# Patient Record
Sex: MALE | Race: WHITE | NOT HISPANIC OR LATINO | Employment: FULL TIME | URBAN - METROPOLITAN AREA
[De-identification: names, ages, dates, MRNs, and addresses within clinical notes are randomized per-mention and may not be internally consistent; named-entity substitution may affect disease eponyms.]

---

## 2024-01-18 ENCOUNTER — HOSPITAL ENCOUNTER (EMERGENCY)
Facility: MEDICAL CENTER | Age: 39
End: 2024-01-19
Attending: EMERGENCY MEDICINE
Payer: COMMERCIAL

## 2024-01-18 ENCOUNTER — APPOINTMENT (OUTPATIENT)
Dept: RADIOLOGY | Facility: MEDICAL CENTER | Age: 39
End: 2024-01-18
Attending: EMERGENCY MEDICINE
Payer: COMMERCIAL

## 2024-01-18 VITALS
SYSTOLIC BLOOD PRESSURE: 130 MMHG | TEMPERATURE: 98.5 F | DIASTOLIC BLOOD PRESSURE: 90 MMHG | HEIGHT: 71 IN | OXYGEN SATURATION: 100 % | RESPIRATION RATE: 16 BRPM | BODY MASS INDEX: 26.67 KG/M2 | HEART RATE: 74 BPM | WEIGHT: 190.48 LBS

## 2024-01-18 DIAGNOSIS — S62.630B OPEN DISPLACED FRACTURE OF DISTAL PHALANX OF RIGHT INDEX FINGER, INITIAL ENCOUNTER: Primary | ICD-10-CM

## 2024-01-18 PROCEDURE — 99283 EMERGENCY DEPT VISIT LOW MDM: CPT

## 2024-01-18 RX ORDER — ACETAMINOPHEN 500 MG
1000 TABLET ORAL ONCE
Status: DISCONTINUED | OUTPATIENT
Start: 2024-01-18 | End: 2024-01-19 | Stop reason: HOSPADM

## 2024-01-18 NOTE — LETTER
[facility], EMERGENCY DEPT   [address], [place] [address]  Phone: Dept: [phone] - Fax:        [facility] Progress Report and Disability Certification  Date of Service: 2024   No Show:  No  Date / Time of Next Visit: 2024   Claim Information   Patient Name: Nicholas Britton  Claim Number:     Employer:   [employer] Date of Injury: 2024     Insurer / TPA:   ID / SSN:    Occupation:  Diagnosis: The encounter diagnosis was Open displaced fracture of distal phalanx of right index finger, initial encounter.    Medical Information   Related to Industrial Injury? Yes ***   Subjective Complaints:  Second evaluation for new amputation of fingertip   Objective Findings: Near potation of fingertip   Pre-Existing Condition(s): None   Assessment:   Condition Same    Status: Additional Care Required  Permanent Disability:No    Plan:      Diagnostics:      Comments:  Patient will need to see orthopedic hand specialist    Disability Information   Status: Released to Restricted Duty    From:  2024  Through: 2024 Restrictions are: Temporary   Physical Restrictions   Sitting:    Standing:    Stooping:    Bending:      Squatting:    Walking:    Climbing:    Pushing:      Pulling:    Other:    Reaching Above Shoulder (L):   Reaching Above Shoulder (R):       Reaching Below Shoulder (L):    Reaching Below Shoulder (R):      Not to exceed Weight Limits   Carrying(hrs):   Weight Limit(lb): < or = to 10 pounds Lifting(hrs):   Weight  Limit(lb): < or = to 10 pounds   Comments: Patient should have restricted light duty, he should not use the injured hand until cleared by orthopedic surgery    Repetitive Actions   Hands: i.e. Fine Manipulations from [place] hrs/day   Feet: i.e. Operating Foot Controls:     Driving / Operate Machinery:     Physician Name: Martínez Saeed Physician Signature: Jay  COLT LEDESMA  e-Signature:  , Medical Director   Clinic Name / Location: [facility], EMERGENCY DEPT  [address]  [place] [address]  [phone]     Clinic Phone Number: Dept: [phone]   Appointment Time:  Visit Start Time:    Check-In Time:  10:11 PM Visit Discharge Time:    Original-Treating Physician or Chiropractor    Page 2-Insurer/TPA    Page 3-Employer    Page 4-Employee

## 2024-01-19 NOTE — ED NOTES
Patient discharged per orders.  Pt verbalized understanding of discharge instructions. Pt leaving in stable condition with all belongings.

## 2024-01-19 NOTE — DISCHARGE INSTRUCTIONS
Continue take the medications prescribed by the prior emergency department.  I have given the name and number and address of [facility], and I want to go there for Long in the morning when they open.  You will need to see a hand surgeon at some point.  Keep an eye out for infection which includes fevers, pus or discharge from the wound, swelling.  If you have any other concerns, please return to the ED for further evaluation and treatment.  Thank you for coming in today.

## 2024-01-19 NOTE — ED TRIAGE NOTES
"Chief Complaint   Patient presents with    Digit Pain     Right index finger, pt was at work when the [employer] mine vehicle was closed and his finger got stuck. No active bleeding. Wound wrapped with gauze on arrival       Pt ambulatory for above complaint. Pt went to [facility] , got stitches and imaging done but was told to come  here for further eval.       BP (!) 133/91   Pulse 79   Temp 36.2 °C (97.1 °F) (Temporal)   Resp 16   Ht 1.803 m (5' 11\")   Wt 86.4 kg (190 lb 7.6 oz)   SpO2 96%   BMI 26.57 kg/m²    "

## 2024-01-19 NOTE — ED PROVIDER NOTES
ED Provider Note    Scribed for Martínez Saeed by Matthew Cross. 1/18/2024  10:40 PM    Primary care provider: No primary care provider on file.  Means of arrival: Walk-in  History obtained from: Patient  History limited by: None    CHIEF COMPLAINT  Chief Complaint   Patient presents with    Digit Pain     Right index finger, pt was at work when the [employer] mine vehicle was closed and his finger got stuck. No active bleeding. Wound wrapped with gauze on arrival     EXTERNAL RECORDS REVIEWED  External ED Note reviewed outside notes and imaging results from patient's visit in [facility] today    HPI/ROS  LIMITATION TO HISTORY   Select: : None    HPI  Nicholas Britton is a 38 y.o. male who presents to the Emergency Department for evaluation of digit pain onset earlier today. 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 The patient states he presented to [facility] initially where he got stitches and imaging done, but was instructed to present to the ED for further evaluation and consultation with surgeon.  The patient states the tip of his finger got assisted amputated when it got trapped in the door. The patient states he was medicated with hydrocodone at the previous facility and denies any pain currently due to the nerve block. The patient reports he also received antibiotics at the previous facility. The patient reports he is an otherwise healthy adult, and doesn't take any daily medications. Patient reports he is UTD on Tdap.       REVIEW OF SYSTEMS  As above, all other systems reviewed and are negative.   See HPI for further details.     PAST MEDICAL HISTORY     SURGICAL HISTORY  patient denies any surgical history  SOCIAL HISTORY      Social History     Substance and Sexual Activity   Drug Use None noted.     FAMILY HISTORY  No family history on file.  CURRENT MEDICATIONS  Home Medications       Reviewed by Luma FERNANDES R.N.  "(Registered Nurse) on 01/18/24 at 2226  Med List Status: Not Addressed     Medication Last Dose Status        Patient Antonio Taking any Medications                         ALLERGIES  No Known Allergies    PHYSICAL EXAM    VITAL SIGNS:   Vitals:    01/18/24 2211 01/18/24 2221 01/18/24 2300   BP: (!) 133/91  (!) 130/90   Pulse: 79  74   Resp: 16  16   Temp: 36.2 °C (97.1 °F)  36.9 °C (98.5 °F)   TempSrc: Temporal  Temporal   SpO2: 96%  100%   Weight:  86.4 kg (190 lb 7.6 oz)    Height:  1.803 m (5' 11\")      Vitals: My interpretation: borderline hypertensive, not tachycardic, afebrile, not hypoxic    Reinterpretation of vitals: Unchanged unremarkable    PE:   Gen: sitting comfortably, speaking clearly, appears in no acute distress   ENT: Mucous membranes moist, posterior pharynx clear, uvula midline, nares patent bilaterally   Neck: Supple, FROM  Pulmonary: Lungs are clear to auscultation bilaterally. No tachypnea  CV:  RRR, no murmur appreciated, pulses 2+ in both upper and lower extremities  Abdomen: soft, NT/ND; no rebound/guarding  : no CVA or suprapubic tenderness   Neuro: A&Ox4 (person, place, time, situation), speech fluent, gait steady, no focal deficits appreciated  Skin: No rash or lesions.  No pallor or jaundice.  No cyanosis.  Warm and dry.   Finger: Right index finger has a large laceration, approximately 50% circumferential to the posterior aspect of the distal tip of the distal phalanx, nailbed involvement is present, stitches intact, clean dry and intact, tissue appears to be warm and well-perfused, not dusky    COURSE & MEDICAL DECISION MAKING  Nursing notes, VS, PMSFHx, imaging  reviewed in chart.    ED Observation Status? No; Patient does not meet criteria for ED Observation.     Ddx: Strain, sprain, fracture, dislocation, laceration    MDM: 10:40 PM Nicholas Britton is a 38 y.o. male who presented with asking for second opinion regarding a near fingertip amputation that happened at work " earlier today.  Patient had a steel door slam on his distal tip of his right index finger.  Approximately 50% circumferential laceration is present with nailbed involvement.  It was repaired at outside facility where x-ray showed open fracture.  It was soaked, thoroughly cleaned, wound was closed, and patient told to follow-up with orthopedic hand surgery.  He drove here tonight to get a second opinion as he is concerned about losing the tip of his finger.  He is healthy, takes no medications, already received tetanus and antibiotics as well as pain medication prescriptions at outside facility.  Upon arrival here is normal vital signs. The wound appears to be clean, dry, intact.  It appears to be a very successful closure.  He has flexion extension at this time but is somewhat limited but this is likely secondary to the distal phalanx fracture.  Cannot rule out tendon injury as the wound is already closed, but this time patient will still be appropriate for orthopedic follow-up.  He feels reassured.  I given the name and number for the [facility] for him to go to the morning to get a third opinion from an actual surgeon.  He verbalized understanding plan and is amenable.  Return precautions were discussed and he verbalized understanding.    ADDITIONAL PROBLEM LIST AND DISPOSITION    I have discussed management of the patient with the following physicians and THIERRY's:  None.    Discussion of management with other QHP or appropriate source(s): None     Escalation of care considered, and ultimately not performed:diagnostic imaging    Barriers to care at this time, including but not limited to: Patient does not have established PCP and Patient does not have insurance.     Decision tools and prescription drugs considered including, but not limited to:  None .    FINAL IMPRESSION  1. Open displaced fracture of distal phalanx of right index finger, initial encounter Acute      I, Matthew LoveScribmaxwell), am  scribing for, and in the presence of, Martínez Saeed.    Electronically signed by: Matthew Cross (Scribe), 1/18/2024    I, Martínez Saeed personally performed the services described in this documentation, as scribed by Matthew Cross in my presence, and it is both accurate and complete.    The note accurately reflects work and decisions made by me.  Martínez Saeed  1/18/2024  11:21 PM

## 2024-02-05 ENCOUNTER — APPOINTMENT (OUTPATIENT)
Dept: ADMISSIONS | Facility: MEDICAL CENTER | Age: 39
End: 2024-02-05
Attending: ORTHOPAEDIC SURGERY
Payer: COMMERCIAL

## 2024-02-06 ENCOUNTER — APPOINTMENT (OUTPATIENT)
Dept: ADMISSIONS | Facility: MEDICAL CENTER | Age: 39
End: 2024-02-06
Attending: ORTHOPAEDIC SURGERY
Payer: COMMERCIAL

## 2024-02-06 RX ORDER — AMOXICILLIN AND CLAVULANATE POTASSIUM 875; 125 MG/1; MG/1
TABLET, FILM COATED ORAL
COMMUNITY
Start: 2024-01-18 | End: 2024-02-06

## 2024-02-06 RX ORDER — IBUPROFEN 200 MG
200 TABLET ORAL EVERY 6 HOURS PRN
COMMUNITY

## 2024-02-06 RX ORDER — OXYCODONE AND ACETAMINOPHEN 7.5; 325 MG/1; MG/1
TABLET ORAL
COMMUNITY
Start: 2024-01-18

## 2024-02-06 RX ORDER — ACETAMINOPHEN 500 MG
500-1000 TABLET ORAL EVERY 6 HOURS PRN
COMMUNITY

## 2024-02-06 RX ORDER — CEPHALEXIN 500 MG/1
CAPSULE ORAL 2 TIMES DAILY
COMMUNITY
Start: 2024-01-19

## 2024-02-06 RX ORDER — HYDROCODONE BITARTRATE AND ACETAMINOPHEN 7.5; 325 MG/1; MG/1
1 TABLET ORAL EVERY 6 HOURS PRN
COMMUNITY
End: 2024-02-06

## 2024-02-06 NOTE — PREPROCEDURE INSTRUCTIONS
PreAdmit Telephone Appointment: Reviewed the Preparing for your procedure handout with patient over the phone. Patient instructed per pharmacy guidelines regarding taking, holding or contacting provider for instructions on regularly prescribed medications before surgery. Instructed to take the following medications the day of surgery with a sip of water per pharmacy medication guidelines: Oxycodone if needed, Tylenol if needed  Pt denies issues with anesthesia      Confirmed with patient where to check in morning of surgery. Handouts/Brochure/Video emailed to patient.

## 2024-02-07 ENCOUNTER — HOSPITAL ENCOUNTER (OUTPATIENT)
Facility: MEDICAL CENTER | Age: 39
End: 2024-02-07
Attending: ORTHOPAEDIC SURGERY | Admitting: ORTHOPAEDIC SURGERY
Payer: COMMERCIAL

## 2024-02-07 ENCOUNTER — ANESTHESIA EVENT (OUTPATIENT)
Dept: SURGERY | Facility: MEDICAL CENTER | Age: 39
End: 2024-02-07
Payer: COMMERCIAL

## 2024-02-07 ENCOUNTER — ANESTHESIA (OUTPATIENT)
Dept: SURGERY | Facility: MEDICAL CENTER | Age: 39
End: 2024-02-07
Payer: COMMERCIAL

## 2024-02-07 ENCOUNTER — APPOINTMENT (OUTPATIENT)
Dept: RADIOLOGY | Facility: MEDICAL CENTER | Age: 39
End: 2024-02-07
Attending: ORTHOPAEDIC SURGERY
Payer: COMMERCIAL

## 2024-02-07 VITALS
RESPIRATION RATE: 16 BRPM | DIASTOLIC BLOOD PRESSURE: 84 MMHG | SYSTOLIC BLOOD PRESSURE: 126 MMHG | HEART RATE: 88 BPM | WEIGHT: 188.49 LBS | HEIGHT: 71 IN | BODY MASS INDEX: 26.39 KG/M2 | OXYGEN SATURATION: 94 % | TEMPERATURE: 96.8 F

## 2024-02-07 PROBLEM — S62.630B DISPLACED FRACTURE OF DISTAL PHALANX OF RIGHT INDEX FINGER, INITIAL ENCOUNTER FOR OPEN FRACTURE: Status: ACTIVE | Noted: 2024-02-07

## 2024-02-07 PROCEDURE — 700101 HCHG RX REV CODE 250: Performed by: ANESTHESIOLOGY

## 2024-02-07 PROCEDURE — 160048 HCHG OR STATISTICAL LEVEL 1-5: Performed by: ORTHOPAEDIC SURGERY

## 2024-02-07 PROCEDURE — 160039 HCHG SURGERY MINUTES - EA ADDL 1 MIN LEVEL 3: Performed by: ORTHOPAEDIC SURGERY

## 2024-02-07 PROCEDURE — C1713 ANCHOR/SCREW BN/BN,TIS/BN: HCPCS | Performed by: ORTHOPAEDIC SURGERY

## 2024-02-07 PROCEDURE — 700111 HCHG RX REV CODE 636 W/ 250 OVERRIDE (IP): Performed by: ANESTHESIOLOGY

## 2024-02-07 PROCEDURE — 160009 HCHG ANES TIME/MIN: Performed by: ORTHOPAEDIC SURGERY

## 2024-02-07 PROCEDURE — 160002 HCHG RECOVERY MINUTES (STAT): Performed by: ORTHOPAEDIC SURGERY

## 2024-02-07 PROCEDURE — 160035 HCHG PACU - 1ST 60 MINS PHASE I: Performed by: ORTHOPAEDIC SURGERY

## 2024-02-07 PROCEDURE — 502240 HCHG MISC OR SUPPLY RC 0272: Performed by: ORTHOPAEDIC SURGERY

## 2024-02-07 PROCEDURE — 700105 HCHG RX REV CODE 258: Performed by: ORTHOPAEDIC SURGERY

## 2024-02-07 PROCEDURE — 502000 HCHG MISC OR IMPLANTS RC 0278: Performed by: ORTHOPAEDIC SURGERY

## 2024-02-07 PROCEDURE — 160025 RECOVERY II MINUTES (STATS): Performed by: ORTHOPAEDIC SURGERY

## 2024-02-07 PROCEDURE — 700101 HCHG RX REV CODE 250: Performed by: ORTHOPAEDIC SURGERY

## 2024-02-07 PROCEDURE — 160046 HCHG PACU - 1ST 60 MINS PHASE II: Performed by: ORTHOPAEDIC SURGERY

## 2024-02-07 PROCEDURE — 160028 HCHG SURGERY MINUTES - 1ST 30 MINS LEVEL 3: Performed by: ORTHOPAEDIC SURGERY

## 2024-02-07 DEVICE — IMPLANTABLE DEVICE: Type: IMPLANTABLE DEVICE | Site: WRIST | Status: FUNCTIONAL

## 2024-02-07 RX ORDER — DEXAMETHASONE SODIUM PHOSPHATE 4 MG/ML
INJECTION, SOLUTION INTRA-ARTICULAR; INTRALESIONAL; INTRAMUSCULAR; INTRAVENOUS; SOFT TISSUE PRN
Status: DISCONTINUED | OUTPATIENT
Start: 2024-02-07 | End: 2024-02-07 | Stop reason: SURG

## 2024-02-07 RX ORDER — LIDOCAINE HYDROCHLORIDE 20 MG/ML
INJECTION, SOLUTION EPIDURAL; INFILTRATION; INTRACAUDAL; PERINEURAL PRN
Status: DISCONTINUED | OUTPATIENT
Start: 2024-02-07 | End: 2024-02-07 | Stop reason: SURG

## 2024-02-07 RX ORDER — SODIUM CHLORIDE, SODIUM LACTATE, POTASSIUM CHLORIDE, CALCIUM CHLORIDE 600; 310; 30; 20 MG/100ML; MG/100ML; MG/100ML; MG/100ML
INJECTION, SOLUTION INTRAVENOUS CONTINUOUS
Status: DISCONTINUED | OUTPATIENT
Start: 2024-02-07 | End: 2024-02-07 | Stop reason: HOSPADM

## 2024-02-07 RX ORDER — ONDANSETRON 2 MG/ML
4 INJECTION INTRAMUSCULAR; INTRAVENOUS
Status: DISCONTINUED | OUTPATIENT
Start: 2024-02-07 | End: 2024-02-07 | Stop reason: HOSPADM

## 2024-02-07 RX ORDER — MIDAZOLAM HYDROCHLORIDE 1 MG/ML
INJECTION INTRAMUSCULAR; INTRAVENOUS PRN
Status: DISCONTINUED | OUTPATIENT
Start: 2024-02-07 | End: 2024-02-07 | Stop reason: SURG

## 2024-02-07 RX ORDER — OXYCODONE HCL 5 MG/5 ML
5 SOLUTION, ORAL ORAL
Status: DISCONTINUED | OUTPATIENT
Start: 2024-02-07 | End: 2024-02-07 | Stop reason: HOSPADM

## 2024-02-07 RX ORDER — MEPERIDINE HYDROCHLORIDE 25 MG/ML
12.5 INJECTION INTRAMUSCULAR; INTRAVENOUS; SUBCUTANEOUS
Status: DISCONTINUED | OUTPATIENT
Start: 2024-02-07 | End: 2024-02-07 | Stop reason: HOSPADM

## 2024-02-07 RX ORDER — ONDANSETRON 2 MG/ML
INJECTION INTRAMUSCULAR; INTRAVENOUS PRN
Status: DISCONTINUED | OUTPATIENT
Start: 2024-02-07 | End: 2024-02-07 | Stop reason: SURG

## 2024-02-07 RX ORDER — HYDROMORPHONE HYDROCHLORIDE 1 MG/ML
0.2 INJECTION, SOLUTION INTRAMUSCULAR; INTRAVENOUS; SUBCUTANEOUS
Status: DISCONTINUED | OUTPATIENT
Start: 2024-02-07 | End: 2024-02-07 | Stop reason: HOSPADM

## 2024-02-07 RX ORDER — HYDROMORPHONE HYDROCHLORIDE 1 MG/ML
0.4 INJECTION, SOLUTION INTRAMUSCULAR; INTRAVENOUS; SUBCUTANEOUS
Status: DISCONTINUED | OUTPATIENT
Start: 2024-02-07 | End: 2024-02-07 | Stop reason: HOSPADM

## 2024-02-07 RX ORDER — HYDRALAZINE HYDROCHLORIDE 20 MG/ML
5 INJECTION INTRAMUSCULAR; INTRAVENOUS
Status: DISCONTINUED | OUTPATIENT
Start: 2024-02-07 | End: 2024-02-07 | Stop reason: HOSPADM

## 2024-02-07 RX ORDER — HALOPERIDOL 5 MG/ML
1 INJECTION INTRAMUSCULAR
Status: DISCONTINUED | OUTPATIENT
Start: 2024-02-07 | End: 2024-02-07 | Stop reason: HOSPADM

## 2024-02-07 RX ORDER — HYDROMORPHONE HYDROCHLORIDE 1 MG/ML
0.6 INJECTION, SOLUTION INTRAMUSCULAR; INTRAVENOUS; SUBCUTANEOUS
Status: DISCONTINUED | OUTPATIENT
Start: 2024-02-07 | End: 2024-02-07 | Stop reason: HOSPADM

## 2024-02-07 RX ORDER — OXYCODONE HCL 5 MG/5 ML
10 SOLUTION, ORAL ORAL
Status: DISCONTINUED | OUTPATIENT
Start: 2024-02-07 | End: 2024-02-07 | Stop reason: HOSPADM

## 2024-02-07 RX ORDER — SODIUM BICARBONATE 42 MG/ML
INJECTION, SOLUTION INTRAVENOUS
Status: DISCONTINUED | OUTPATIENT
Start: 2024-02-07 | End: 2024-02-07 | Stop reason: HOSPADM

## 2024-02-07 RX ORDER — DIPHENHYDRAMINE HYDROCHLORIDE 50 MG/ML
12.5 INJECTION INTRAMUSCULAR; INTRAVENOUS
Status: DISCONTINUED | OUTPATIENT
Start: 2024-02-07 | End: 2024-02-07 | Stop reason: HOSPADM

## 2024-02-07 RX ORDER — BUPIVACAINE HYDROCHLORIDE AND EPINEPHRINE 5; 5 MG/ML; UG/ML
INJECTION, SOLUTION EPIDURAL; INTRACAUDAL; PERINEURAL
Status: DISCONTINUED | OUTPATIENT
Start: 2024-02-07 | End: 2024-02-07 | Stop reason: HOSPADM

## 2024-02-07 RX ORDER — CEFAZOLIN SODIUM 1 G/3ML
INJECTION, POWDER, FOR SOLUTION INTRAMUSCULAR; INTRAVENOUS PRN
Status: DISCONTINUED | OUTPATIENT
Start: 2024-02-07 | End: 2024-02-07 | Stop reason: SURG

## 2024-02-07 RX ADMIN — FENTANYL CITRATE 50 MCG: 50 INJECTION, SOLUTION INTRAMUSCULAR; INTRAVENOUS at 09:03

## 2024-02-07 RX ADMIN — SODIUM CHLORIDE, POTASSIUM CHLORIDE, SODIUM LACTATE AND CALCIUM CHLORIDE: 600; 310; 30; 20 INJECTION, SOLUTION INTRAVENOUS at 08:59

## 2024-02-07 RX ADMIN — CEFAZOLIN 2 G: 1 INJECTION, POWDER, FOR SOLUTION INTRAMUSCULAR; INTRAVENOUS at 09:06

## 2024-02-07 RX ADMIN — SODIUM CHLORIDE, POTASSIUM CHLORIDE, SODIUM LACTATE AND CALCIUM CHLORIDE: 600; 310; 30; 20 INJECTION, SOLUTION INTRAVENOUS at 09:03

## 2024-02-07 RX ADMIN — PROPOFOL 200 MG: 10 INJECTION, EMULSION INTRAVENOUS at 09:04

## 2024-02-07 RX ADMIN — ONDANSETRON 4 MG: 2 INJECTION INTRAMUSCULAR; INTRAVENOUS at 09:06

## 2024-02-07 RX ADMIN — DEXAMETHASONE SODIUM PHOSPHATE 8 MG: 4 INJECTION INTRA-ARTICULAR; INTRALESIONAL; INTRAMUSCULAR; INTRAVENOUS; SOFT TISSUE at 09:06

## 2024-02-07 RX ADMIN — LIDOCAINE HYDROCHLORIDE 100 MG: 20 INJECTION, SOLUTION EPIDURAL; INFILTRATION; INTRACAUDAL at 09:04

## 2024-02-07 RX ADMIN — MIDAZOLAM HYDROCHLORIDE 2 MG: 1 INJECTION, SOLUTION INTRAMUSCULAR; INTRAVENOUS at 09:03

## 2024-02-07 RX ADMIN — LIDOCAINE HYDROCHLORIDE 0.5 ML: 10 SOLUTION INTRAVENOUS at 08:59

## 2024-02-07 ASSESSMENT — PAIN DESCRIPTION - PAIN TYPE
TYPE: ACUTE PAIN
TYPE: SURGICAL PAIN

## 2024-02-07 ASSESSMENT — PAIN SCALES - GENERAL: PAIN_LEVEL: 1

## 2024-02-07 NOTE — ANESTHESIA POSTPROCEDURE EVALUATION
Patient: Nicholas Britton    Procedure Summary       Date: 02/07/24 Room / Location: [place] [id number] / SURGERY [facility]    Anesthesia Start: 0903 Anesthesia Stop: 0945    Procedure: OPEN REDUCTION INTERNAL FIXATION VERSUS CLOSED REDUCTION PINNING RIGHT INDEX FINGER DISTAL PHALANX (Right: Wrist) Diagnosis: (CLOSED FRACTURE OF DISTAL PHALANX OF FINGER)    Surgeons: Braulio Canseco M.D. Responsible Provider: Jed Hunter M.D.    Anesthesia Type: general ASA Status: 1            Final Anesthesia Type: general  Last vitals  BP   Blood Pressure: 121/76    Temp   36.5 °C (97.7 °F)    Pulse   76   Resp   16    SpO2   95 %      Anesthesia Post Evaluation    Patient location during evaluation: PACU  Patient participation: complete - patient participated  Level of consciousness: awake and alert  Pain score: 1    Airway patency: patent  Anesthetic complications: no  Cardiovascular status: hemodynamically stable  Respiratory status: acceptable  Hydration status: euvolemic    PONV: none          No notable events documented.     Nurse Pain Score: 1 (NPRS)

## 2024-02-07 NOTE — OR NURSING
Late entry; Patient allergies and NPO status verified, home medication reconciliation completed and belongings secured. Surgical site verified with patient. Patient verbalizes understanding of pain scale, expected course of stay and plan of care; patient and family state verbal understanding at this time. IV access established. Sequential in place as ordered.

## 2024-02-07 NOTE — ANESTHESIA PROCEDURE NOTES
Airway    Date/Time: 2/7/2024 9:06 AM    Performed by: Jed Hunter M.D.  Authorized by: Jed Hunter M.D.    Location:  OR  Urgency:  Elective  Indications for Airway Management:  Anesthesia      Spontaneous Ventilation: absent    Sedation Level:  Deep  Preoxygenated: Yes    Final Airway Type:  Supraglottic airway  Final Supraglottic Airway:  Standard LMA    SGA Size:  4  Number of Attempts at Approach:  1

## 2024-02-07 NOTE — OR NURSING
0938: To PACU from OR via gurney, sleeping, respirations spontaneous and non-labored via LMA. R index finger dressings/splint c/d/i. R radial +2, R hand pink/warm with brisk CRF.  0940: Extubated by anesthesia  0945: Does not rouse to name.  0958: Rouses spontaneously, denies pain/nausea, O2 d/adithya, commenced po water.  1000: Awaiting d/c orders from surgeon  1013: No change in surgical site assessment. Meets criteria to transfer to Stage 2

## 2024-02-07 NOTE — OP REPORT
Date of surgery: 2/7/2024    Preoperative diagnosis:  1-open fracture right index finger distal phalanx  2-right index finger nailbed injury    Postoperative diagnosis: Same    Surgery performed:  1-right index finger distal phalanx open fracture irrigation and excisional debridement  2-right index finger distal phalanx fracture open reduction internal fixation  3-right index finger nailbed direct repair    Surgeon: Braulio Canseco MD    Anesthesia: General    First assist: Selina Sloan CFA    Complications: None    Findings: Disruption of the nailbed immediately superficial to the fracture site.  Evidence of open fracture with visualization of the fracture site through the nailbed injury.    Tourniquet time: 23 minutes    Tourniquet pressure: 250 mmHg    Estimated blood loss: 5 mL    Implants: Skeletal dynamics redocked screw    Indications for procedure: Nicholas is a pleasant gentleman who sustained a work-related injury resulting in the above-mentioned injury.  Attempted nonoperative treatment failed and he had increased displacement of the fracture site.  Therefore the decision was made to taken the operating today for the above-mentioned procedure.    Description of procedure: On the date of surgery Nicholas was seen in the preoperative area where informed consent was obtained with all risks and benefits of the procedure explained and all questions answered.  He wished to proceed with the surgery.  The proper site was marked.  He was subsequently taken to the operating room placed in the supine position with all bony promises well-padded.  General anesthesia was induced.  A tourniquet is placed on the right upper extremities then prepped and draped in usual sterile fashion.    A timeout was performed with all persons in attendance agreeing on the proper patient, proper surgical site and proper surgery be performed.    An Esmarch was used to exsanguinate the right upper extremity the tourniquet was insufflated to  250 mmHg.  I then used a [place] to elevate the nail of the index finger off of the nailbed.  Upon doing so I was able to visualize the nailbed injury.  There was exposed fracture site deep to the area of nailbed injury which appeared as an open wound.  I was then able to place a [place] into the fracture site and opened up the fracture site.  Nonviable tissue adjacent to the nailbed was removed using tenotomy scissors.  I was also able to remove nonviable tissue within the fracture site.  The fracture site was then thoroughly irrigated with copious amounts of normal saline.    I then performed an open reduction maneuver.  I placed a guidewire traversing the distal fragment and into the proximal fragment.  I then made an incision adjacent to the guidewire.  I verified position using fluoroscopy.  I then measured for a skeletal dynamics reductive screw.  I then drilled over the guidewire for placement of the screw.  I then placed a skeletal dynamics 2.5 mm x 12 mm screw.  This had excellent compression and fixation.  The guidewire was then removed.  Final images were obtained.    I then repaired the nailbed using 4-0 Chromic Gut suture.  This was able to be covered over the fracture site and had good reapproximation.  The incision site at the distal end of the index finger was closed using 4 nylon.  The wounds were then dressed with Xeroform, 4 x 4,, a tongue depressor was placed for immobilization and overwrapped with Marcus wrap.  The tourniquet was deflated.  General anesthesia was reversed and he was taken to the PACU in good stable condition.    Disposition: He will be discharged home on a regular diet.  He will have a 10 pound lifting restriction to the right upper extremity with respect to activities of daily living.  He will be out of work until 2 weeks postop.  2 weeks postop we will remove the splint and the sutures.  He will then be placed into a stack splint.  He may return to work with limited lifting  restrictions of the right upper extremity at that time.  He will continue with the splint until 6 weeks postop.  At that time we will increase his lifting restriction as needed and he will begin OT as needed.  He was prescribed narcotic pain medication as well as postoperative oral antibiotics to prevent infection.  He was instructed not to drive or operate machinery while taking the postoperative pain medication.

## 2024-02-07 NOTE — DISCHARGE INSTRUCTIONS
ACTIVITY: Rest and take it easy for the first 24 hours.  A responsible adult is recommended to remain with you during that time.  It is normal to feel sleepy.  We encourage you to not do anything that requires balance, judgment or coordination.    MILD FLU-LIKE SYMPTOMS ARE NORMAL. YOU MAY EXPERIENCE GENERALIZED MUSCLE ACHES, THROAT IRRITATION, HEADACHE AND/OR SOME NAUSEA.    FOR 24 HOURS DO NOT:  Drive, operate machinery or run household appliances.  Drink beer or alcoholic beverages.   Make important decisions or sign legal documents.    SPECIAL INSTRUCTIONS: Keep dressings clean,dry and leave in place until advised otherwise by your surgeon. Follow instructions given pre-op by Dr Canseco.     DIET: To avoid nausea, slowly advance diet as tolerated, avoiding spicy or greasy foods for the first day.  Add more substantial food to your diet according to your physician's instructions.    INCREASE FLUIDS AND FIBER TO AVOID CONSTIPATION.    You should CALL YOUR PHYSICIAN if you develop:  Fever greater than 101 degrees F.  Pain not relieved by medication, or persistent nausea or vomiting.  Excessive bleeding (blood soaking through dressing) or unexpected drainage from the wound.  Extreme redness or swelling around the incision site, drainage of pus or foul smelling drainage.  Inability to urinate or empty your bladder within 8 hours.  Problems with breathing or chest pain.    You should call 911 if you develop problems with breathing or chest pain.  If you are unable to contact your doctor or surgical center, you should go to the nearest emergency room or urgent care center.  Physician's telephone #: [phone]    If any questions arise, call your doctor.  If your doctor is not available, please feel free to call the Surgical Center at [phone].     A registered nurse may call you a few days after your surgery to see how you are doing after your procedure.    MEDICATIONS: Resume taking daily medication.  Take  prescribed pain medication with food.  If no medication is prescribed, you may take non-aspirin pain medication if needed.  PAIN MEDICATION CAN BE VERY CONSTIPATING.  Take a stool softener or laxative such as senokot, pericolace, or milk of magnesia if needed.    Last pain medication given at _______________________.    If your physician has prescribed pain medication that includes Acetaminophen (Tylenol), do not take additional Acetaminophen (Tylenol) while taking the prescribed medication.What to Expect Post Anesthesia    Rest and take it easy for the first 24 hours.  A responsible adult is recommended to remain with you during that time.  It is normal to feel sleepy.  We encourage you to not do anything that requires balance, judgment or coordination.    FOR 24 HOURS DO NOT:  Drive, operate machinery or run household appliances.  Drink beer or alcoholic beverages.  Make important decisions or sign legal documents.    To avoid nausea, slowly advance diet as tolerated, avoiding spicy or greasy foods for the first day.  Add more substantial food to your diet according to your provider's instructions.  Babies can be fed formula or breast milk as soon as they are hungry.  INCREASE FLUIDS AND FIBER TO AVOID CONSTIPATION.    MILD FLU-LIKE SYMPTOMS ARE NORMAL.  YOU MAY EXPERIENCE GENERALIZED MUSCLE ACHES, THROAT IRRITATION, HEADACHE AND/OR SOME NAUSEA.

## 2024-02-07 NOTE — ANESTHESIA PREPROCEDURE EVALUATION
Case: [id number] Date/Time: 02/07/24 0915    Procedure: OPEN REDUCTION INTERNAL FIXATION VERSUS CLOSED REDUCTION PINNING RIGHT INDEX FINGER DISTAL PHALANX (Right: Wrist)    Anesthesia type: General    Pre-op diagnosis: CLOSED FRACTURE OF DISTAL PHALANX OF FINGER    Location:  OR 02 / SURGERY [facility]    Surgeons: Braulio Canseco M.D.            Relevant Problems   No relevant active problems       Physical Exam    Airway   Mallampati: II  TM distance: >3 FB  Neck ROM: full       Cardiovascular - normal exam  Rhythm: regular  Rate: normal  (-) murmur     Dental - normal exam           Pulmonary - normal exam  Breath sounds clear to auscultation     Abdominal    Neurological - normal exam                   Anesthesia Plan    ASA 1       Plan - general       Airway plan will be LMA          Induction: intravenous    Postoperative Plan: Postoperative administration of opioids is intended.    Pertinent diagnostic labs and testing reviewed    Informed Consent:    Anesthetic plan and risks discussed with patient.    Use of blood products discussed with: patient whom consented to blood products.

## 2024-02-07 NOTE — ANESTHESIA TIME REPORT
Anesthesia Start and Stop Event Times       Date Time Event    2/7/2024 0852 Ready for Procedure     0903 Anesthesia Start     0945 Anesthesia Stop          Responsible Staff  02/07/24      Name Role Begin End    Jed Hunter M.D. Anesth 0903 0945          Overtime Reason:  no overtime (within assigned shift)    Comments: Elsa

## 2025-01-23 ENCOUNTER — APPOINTMENT (OUTPATIENT)
Dept: URBAN - NONMETROPOLITAN AREA CLINIC 12 | Facility: CLINIC | Age: 40
Setting detail: DERMATOLOGY
End: 2025-01-23

## 2025-01-23 DIAGNOSIS — L73.9 FOLLICULAR DISORDER, UNSPECIFIED: ICD-10-CM | Status: INADEQUATELY CONTROLLED

## 2025-01-23 DIAGNOSIS — L663 OTHER SPECIFIED DISEASES OF HAIR AND HAIR FOLLICLES: ICD-10-CM | Status: INADEQUATELY CONTROLLED

## 2025-01-23 DIAGNOSIS — Z71.89 OTHER SPECIFIED COUNSELING: ICD-10-CM

## 2025-01-23 DIAGNOSIS — L738 OTHER SPECIFIED DISEASES OF HAIR AND HAIR FOLLICLES: ICD-10-CM | Status: INADEQUATELY CONTROLLED

## 2025-01-23 DIAGNOSIS — D22 MELANOCYTIC NEVI: ICD-10-CM

## 2025-01-23 PROBLEM — L02.92 FURUNCLE, UNSPECIFIED: Status: ACTIVE | Noted: 2025-01-23

## 2025-01-23 PROBLEM — D22.5 MELANOCYTIC NEVI OF TRUNK: Status: ACTIVE | Noted: 2025-01-23

## 2025-01-23 PROBLEM — D23.61 OTHER BENIGN NEOPLASM OF SKIN OF RIGHT UPPER LIMB, INCLUDING SHOULDER: Status: ACTIVE | Noted: 2025-01-23

## 2025-01-23 PROCEDURE — ? OBSERVATION

## 2025-01-23 PROCEDURE — ? COUNSELING

## 2025-01-23 PROCEDURE — ? PRESCRIPTION

## 2025-01-23 PROCEDURE — ? OTHER

## 2025-01-23 PROCEDURE — 99204 OFFICE O/P NEW MOD 45 MIN: CPT

## 2025-01-23 ASSESSMENT — LOCATION SIMPLE DESCRIPTION DERM
LOCATION SIMPLE: LEFT UPPER BACK
LOCATION SIMPLE: RIGHT HAND

## 2025-01-23 ASSESSMENT — LOCATION ZONE DERM
LOCATION ZONE: HAND
LOCATION ZONE: TRUNK

## 2025-01-23 ASSESSMENT — LOCATION DETAILED DESCRIPTION DERM
LOCATION DETAILED: LEFT SUPERIOR LATERAL UPPER BACK
LOCATION DETAILED: RIGHT DORSAL INDEX METACARPOPHALANGEAL JOINT

## 2025-01-23 NOTE — PROCEDURE: OTHER
Detail Level: Detailed
Render Risk Assessment In Note?: yes
Note Text (......Xxx Chief Complaint.): This diagnosis correlates with the
Other (Free Text): Order compound medication for scalp acne

## 2025-01-24 RX ADMIN — Medication: at 00:00

## 2025-01-25 RX ORDER — TRETIN/BENZ/CLINDAMY/SPIRO/B3 0.05%-10%
GEL (GRAM) TOPICAL
Qty: 30 | Refills: 2 | Status: ERX | COMMUNITY
Start: 2025-01-24

## (undated) DEVICE — SUTURE GENERAL

## (undated) DEVICE — SET EXTENSION WITH 2 PORTS (48EA/CA) ***PART #2C8610 IS A SUBSTITUTE*****

## (undated) DEVICE — PACK UPPER EXTREMITY SM OR - (3/CA)

## (undated) DEVICE — HUMID-VENT HEAT AND MOISTURE EXCHANGE- (50/BX)

## (undated) DEVICE — SUCTION INSTRUMENT YANKAUER BULBOUS TIP W/O VENT (50EA/CA)

## (undated) DEVICE — Device

## (undated) DEVICE — PAD PREP 24 X 48 CUFFED - (100/CA)

## (undated) DEVICE — TUBING CLEARLINK DUO-VENT - C-FLO (48EA/CA)

## (undated) DEVICE — PADDING CAST 4 IN STERILE - 4 X 4 YDS (24/CA)

## (undated) DEVICE — DRAPE FLUOROSCAN C-ARM - 54 X 78 (40EA/CA)

## (undated) DEVICE — ELECTRODE DUAL RETURN W/ CORD - (50/PK)

## (undated) DEVICE — SENSOR OXIMETER ADULT SPO2 RD SET (20EA/BX)

## (undated) DEVICE — SODIUM CHL IRRIGATION 0.9% 1000ML (12EA/CA)

## (undated) DEVICE — TOWEL STOP TIMEOUT SAFETY FLAG (40EA/CA)

## (undated) DEVICE — COVER LIGHT HANDLE FLEXIBLE - SOFT (2EA/PK 80PK/CA)

## (undated) DEVICE — SUTURE 4-0 ETHILON PS-2 18 (12PK/BX)"

## (undated) DEVICE — LACTATED RINGERS INJ 1000 ML - (14EA/CA 60CA/PF)

## (undated) DEVICE — SLING ORTH UNV TIETX VLFM ARM